# Patient Record
Sex: FEMALE | Race: BLACK OR AFRICAN AMERICAN | NOT HISPANIC OR LATINO | Employment: PART TIME | URBAN - METROPOLITAN AREA
[De-identification: names, ages, dates, MRNs, and addresses within clinical notes are randomized per-mention and may not be internally consistent; named-entity substitution may affect disease eponyms.]

---

## 2022-10-16 ENCOUNTER — HOSPITAL ENCOUNTER (EMERGENCY)
Facility: CLINIC | Age: 22
Discharge: HOME | End: 2022-10-16
Attending: EMERGENCY MEDICINE | Admitting: EMERGENCY MEDICINE
Payer: COMMERCIAL

## 2022-10-16 ENCOUNTER — APPOINTMENT (OUTPATIENT)
Dept: GENERAL RADIOLOGY | Facility: CLINIC | Age: 22
End: 2022-10-16
Attending: EMERGENCY MEDICINE
Payer: COMMERCIAL

## 2022-10-16 VITALS
HEART RATE: 84 BPM | TEMPERATURE: 100 F | OXYGEN SATURATION: 100 % | RESPIRATION RATE: 18 BRPM | SYSTOLIC BLOOD PRESSURE: 126 MMHG | DIASTOLIC BLOOD PRESSURE: 76 MMHG

## 2022-10-16 DIAGNOSIS — S82.201A CLOSED FRACTURE OF RIGHT TIBIA AND FIBULA, INITIAL ENCOUNTER: ICD-10-CM

## 2022-10-16 DIAGNOSIS — S82.401A CLOSED FRACTURE OF RIGHT TIBIA AND FIBULA, INITIAL ENCOUNTER: ICD-10-CM

## 2022-10-16 PROCEDURE — 27750 TREATMENT OF TIBIA FRACTURE: CPT | Mod: RT

## 2022-10-16 PROCEDURE — 250N000013 HC RX MED GY IP 250 OP 250 PS 637: Performed by: EMERGENCY MEDICINE

## 2022-10-16 PROCEDURE — 99284 EMERGENCY DEPT VISIT MOD MDM: CPT | Mod: 25

## 2022-10-16 PROCEDURE — 250N000011 HC RX IP 250 OP 636: Performed by: EMERGENCY MEDICINE

## 2022-10-16 PROCEDURE — 73610 X-RAY EXAM OF ANKLE: CPT | Mod: RT

## 2022-10-16 RX ORDER — IBUPROFEN 600 MG/1
600 TABLET, FILM COATED ORAL ONCE
Status: COMPLETED | OUTPATIENT
Start: 2022-10-16 | End: 2022-10-16

## 2022-10-16 RX ORDER — ONDANSETRON 4 MG/1
4 TABLET, ORALLY DISINTEGRATING ORAL ONCE
Status: COMPLETED | OUTPATIENT
Start: 2022-10-16 | End: 2022-10-16

## 2022-10-16 RX ORDER — HYDROCODONE BITARTRATE AND ACETAMINOPHEN 5; 325 MG/1; MG/1
1 TABLET ORAL ONCE
Status: COMPLETED | OUTPATIENT
Start: 2022-10-16 | End: 2022-10-16

## 2022-10-16 RX ADMIN — HYDROCODONE BITARTRATE AND ACETAMINOPHEN 1 TABLET: 5; 325 TABLET ORAL at 22:13

## 2022-10-16 RX ADMIN — ONDANSETRON 4 MG: 4 TABLET, ORALLY DISINTEGRATING ORAL at 22:13

## 2022-10-16 RX ADMIN — IBUPROFEN 600 MG: 600 TABLET ORAL at 20:43

## 2022-10-16 ASSESSMENT — ENCOUNTER SYMPTOMS: ARTHRALGIAS: 1

## 2022-10-16 ASSESSMENT — ACTIVITIES OF DAILY LIVING (ADL): ADLS_ACUITY_SCORE: 35

## 2022-10-17 NOTE — DISCHARGE INSTRUCTIONS
As we discussed, you did break your ankle in 2 places, 1 on the inside and one of the outside of your ankle.  Please keep the brace on at all times, and do not walk on your ankle at all, be sure to use the crutches.  Please follow-up with [facility] orthopedics in the next 1 week, this is very important.  Please call them tomorrow and schedule an appointment for the end of this week.  Please use Tylenol and Motrin to help with your pain, and come back to the ER immediately if your pain is not controlled with those medications, or if you notice any numbness or tingling, redness, or other concerns around the site of pain, or on your toes under the splint.  Come back with any other concerns you have.

## 2022-10-17 NOTE — ED PROVIDER NOTES
History   Chief Complaint:  Ankle Injury       HPI   Alisia Rey is a 22 year old female who presents with right ankle pain after tripping and falling while roller blading. She denies loss of consciousness or hitting her head. Her ankle swelled right away. She denies numbness. She denies any pain currently after ibuprofen.     Review of Systems   Musculoskeletal: Positive for arthralgias.   Neurological: Negative for syncope.   All other systems reviewed and are negative.      Allergies:  The patient has no known allergies.     Medications:  The patient is currently on no regular medications.    Past Medical History:     The patient denies past medical history.     Social History:  The patient presents to the ED with a family member  PCP: No primary care provider on file.     Physical Exam     Patient Vitals for the past 24 hrs:   BP Temp Temp src Pulse Resp SpO2   10/16/22 2122 130/80 100  F (37.8  C) Oral -- 18 99 %   10/16/22 2038 130/73 99  F (37.2  C) Temporal 94 16 99 %       Physical Exam  Vitals: reviewed by me  General: Pt seen on [facility], cooperative, and alert to conversation  Eyes: Tracking well, clear conjunctiva BL  ENT: MMM, midline trachea.   Lungs: No tachypnea, no accessory muscle use. No respiratory distress.   MSK: no joint effusion.  No evidence of trauma, no swelling, no skin changes to right lower extremity, apart from mild swelling and tenderness noted to the distal lateral aspect of the fibula.  Distal foot is warm and well-perfused, sensation is intact light touch throughout, and there are no skin breaks.  She has robust dorsalis pedis pulse, and this distal extremity is warm and well-perfused.  No other trauma is noted  Skin: No rash  Neuro: Clear speech and no facial droop.  Psych: Not RIS, no e/o AH/VH    Emergency Department Course     Imaging:  Ankle XR, G/E 3 views, right   Final Result   IMPRESSION:   1.  Lateral subluxation of the tibiotalar joint.   2.   Comminuted mildly displaced fracture of the fibula distal metadiaphysis.   3.  Small nondisplaced fracture of the tibia posterior malleolus. Thin avulsive fracture fragment (likely arising from the medial malleolus) in the soft tissues of the medial ankle.   4.  Soft tissue swelling about the ankle.        Report per radiology    Procedure:    Splint Placement     Procedure: Splint Placement     Indication: Fracture    Consent: Verbal     Location: Right Ankle    Preparation: none  Procedure detail:   Splint was applied by Tech/Nurse with my assistance  Splint type: Short leg  and Stirrup  Splint materilal: Fiberglass  After placement I checked and adjusted the fit as needed to ensure proper positioning/fit   Sensation and circulation are intact after splint placement     Patient Status: The patient tolerated the procedure well: Yes. There were no complications.    Emergency Department Course:     Reviewed:  I reviewed nursing notes, vitals, past medical history and Care Everywhere    Assessments:  2110 I obtained history and examined the patient as noted above.     2120 I rechecked the patient and explained findings.     Interventions:  2043 ibuprofen 600 mg PO    2210 Norco 1 tab PO    2210 Zofran 1 tab PO    Disposition:  The patient was discharged to home.     Impression & Plan     Medical Decision Making:  This is a pleasant 22-year-old female who presents emergency room with appears to be an ankle fracture.  This fits with the mechanism of rollerskating, and unfortunately given the proximal location she should absolutely be made nonweightbearing.  We put her in a short leg cast, and given her crutches and she does appear to be stable to go home.  No indication for emergent reduction right now or admission to the hospital for pain control, she is doing very well on Motrin only.  We will plan for discharge as above, can follow safely with orthopedics in the next week.      Diagnosis:    ICD-10-CM    1. Closed  fracture of right tibia and fibula, initial encounter  S82.201A     S82.401A           Scribe Disclosure:  I, Ruthie Alvarez, am serving as a scribe at 9:04 PM on 10/16/2022 to document services personally performed by Hemant Vasquez MD based on my observations and the provider's statements to me.            Hemant Vasquez MD  10/16/22 4218

## 2022-10-17 NOTE — ED TRIAGE NOTES
Pt rollerskating at [facility]. Fell and twisted right ankle. Denies hitting head or LOC. Swelling noted to ankle. CMS intact.